# Patient Record
(demographics unavailable — no encounter records)

---

## 2024-11-26 NOTE — HISTORY OF PRESENT ILLNESS
[FreeTextEntry1] : ---- 15 Y/O   HERE FOR PP EXAM; ; GIRL; NOT NURSING; 10/9/24 PMHX;/HX OF CVA  LAMERIRS DISEASE PSHX;/ SOCIAL HX;-ETOH -CIGG  STD;  / /        STD PREVENTION DISCUSSED FAMILY HISTORY OF BREAST CANCER;NO REVIEW OF SYMPTOMS DONE; ALLERGIES; pt answered NKDA Medication reconciliation was completed by reviewing, with the patient's involvement, the patient's current outpatient medications and those  ordered for the patient today.           PE; ABDOMEN;SOFT NT ND NL GENITALIA VAGINA -DC CERVIX;-CMT UTERUS;NL SIZE NT AV ADNEXA; NT - MASSES   A;P;PP EXAM -PAP UP TO DATE -NEXPLANON PLACED IN [facility] -F-U PRN.

## 2024-11-26 NOTE — COUNSELING
[Nutrition/ Exercise/ Weight Management] : nutrition, exercise, weight management [Body Image] : body image [Vitamins/Supplements] : vitamins/supplements [Sunscreen] : sunscreen [STD (testing, results, tx)] : STD (testing, results, tx)